# Patient Record
Sex: FEMALE | Race: WHITE | Employment: UNEMPLOYED | URBAN - METROPOLITAN AREA
[De-identification: names, ages, dates, MRNs, and addresses within clinical notes are randomized per-mention and may not be internally consistent; named-entity substitution may affect disease eponyms.]

---

## 2018-10-01 ENCOUNTER — HOSPITAL ENCOUNTER (EMERGENCY)
Age: 38
Discharge: HOME | End: 2018-10-01
Attending: EMERGENCY MEDICINE
Payer: COMMERCIAL

## 2018-10-01 VITALS
BODY MASS INDEX: 21.71 KG/M2 | HEART RATE: 81 BPM | WEIGHT: 118 LBS | DIASTOLIC BLOOD PRESSURE: 71 MMHG | SYSTOLIC BLOOD PRESSURE: 121 MMHG | TEMPERATURE: 98.3 F | OXYGEN SATURATION: 100 % | RESPIRATION RATE: 14 BRPM

## 2018-10-01 DIAGNOSIS — M79.89 SOFT TISSUE MASS: Primary | ICD-10-CM

## 2018-10-01 PROCEDURE — 99282 EMERGENCY DEPT VISIT SF MDM: CPT

## 2018-10-01 RX ORDER — IBUPROFEN 600 MG/1
600 TABLET ORAL EVERY 6 HOURS PRN
Qty: 20 TABLET | Refills: 0 | Status: SHIPPED | OUTPATIENT
Start: 2018-10-01 | End: 2018-10-05

## 2018-10-01 NOTE — ED PROVIDER NOTES
Grandmother     Hypertension Paternal Grandmother     Diabetes Paternal Grandmother     Diabetes Paternal Grandfather      Family Status   Relation Status    Father     Mother Alive    Sister Alive    Brother     Sister Alive    Brother Alive    PAunt (Not Specified)    PUnc (Not Specified)    MGM (Not Specified)    PGM (Not Specified)    PGF (Not Specified)        SOCIAL HISTORY      reports that she has been smoking. She has a 2.50 pack-year smoking history. She has never used smokeless tobacco. She reports that she drinks alcohol. She reports that she does not use drugs. REVIEW OF SYSTEMS    (2-9 systems for level 4, 10 or more for level 5)     Review of Systems   All other systems reviewed and are negative. Except as noted above the remainder of the review of systems was reviewed and negative. PHYSICAL EXAM    (up to 7 for level 4, 8 or more for level 5)     ED Triage Vitals [10/01/18 1637]   BP Temp Temp Source Pulse Resp SpO2 Height Weight   121/71 98.3 °F (36.8 °C) Oral 81 14 100 % -- 118 lb (53.5 kg)       Physical Exam   Constitutional: She is oriented to person, place, and time. She appears well-developed and well-nourished. She does not appear ill. No distress. HENT:   Head: Normocephalic and atraumatic. Mouth/Throat: Oropharynx is clear and moist. No oropharyngeal exudate. Eyes: Conjunctivae are normal. Right eye exhibits no discharge. Left eye exhibits no discharge. Cardiovascular: Normal rate and regular rhythm. Pulmonary/Chest: Effort normal. No respiratory distress. Musculoskeletal: Normal range of motion. She exhibits no edema, tenderness or deformity. Legs:  Neurological: She is alert and oriented to person, place, and time. Skin: Skin is warm and dry. No erythema.          DIAGNOSTIC RESULTS     EKG: All EKG's are interpreted by the Emergency Department Physician who either signs or Co-signs this chart in the absence of a cardiologist.    RADIOLOGY:   Non-plain film images such as CT, Ultrasound and MRI are read by the radiologist. Plain radiographic images are visualized and preliminarily interpreted by the emergency physician with the below findings:    Interpretation per the Radiologist below, if available at the time of this note:    No orders to display       LABS:  Labs Reviewed - No data to display    All other labs were within normal range or not returned as of this dictation. EMERGENCY DEPARTMENT COURSE and DIFFERENTIAL DIAGNOSIS/MDM:   Vitals:    Vitals:    10/01/18 1637   BP: 121/71   Pulse: 81   Resp: 14   Temp: 98.3 °F (36.8 °C)   TempSrc: Oral   SpO2: 100%   Weight: 118 lb (53.5 kg)       Medical Decision Making: A 57-year-old female who is afebrile and does not appear ill. There is a small mobile area of the right hip that is not fell on the left. There is no evidence of cellulitis or abscess. Although he were to workup was not indicated she was educated the importance of following up with a primary care provider for possible biopsy or MRI. FINAL IMPRESSION      1. Soft tissue mass          DISPOSITION/PLAN   DISPOSITION Decision To Discharge 10/01/2018 04:58:28 PM      PATIENT REFERRED TO:     Follow-up with a primary care provider by calling [facility]  Call in 1 day        DISCHARGE MEDICATIONS:     Discharge Medication List as of 10/1/2018  5:01 PM          (Please note that portions of this note were completed with a voice recognition program.  Efforts were made to edit the dictations but occasionally words are mis-transcribed. )    LEN GRANADO CNP, APRN - CNP  10/01/18 1737

## 2018-10-01 NOTE — ED PROVIDER NOTES
Attending Supervisory Note/Shared Visit   I have personally performed a face to face diagnostic evaluation on this patient. I have reviewed the mid-levels findings and agree.         (Please note that portions of this note were completed with a voice recognition program.  Efforts were made to edit the dictations but occasionally words are mis-transcribed.)    Vasu Nunez MD  Attending Emergency Physician       Vasu Nunez MD  10/01/18 7242